# Patient Record
Sex: FEMALE | Race: WHITE
[De-identification: names, ages, dates, MRNs, and addresses within clinical notes are randomized per-mention and may not be internally consistent; named-entity substitution may affect disease eponyms.]

---
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
## 2025-03-17 ENCOUNTER — HOSPITAL ENCOUNTER (EMERGENCY)
Dept: HOSPITAL 15 - ER | Age: 20
Discharge: HOME | End: 2025-03-17
Payer: MEDICAID

## 2025-03-17 VITALS
SYSTOLIC BLOOD PRESSURE: 117 MMHG | DIASTOLIC BLOOD PRESSURE: 69 MMHG | RESPIRATION RATE: 16 BRPM | HEART RATE: 85 BPM | OXYGEN SATURATION: 98 % | TEMPERATURE: 98 F

## 2025-03-17 VITALS — WEIGHT: 102.29 LBS | BODY MASS INDEX: 18.12 KG/M2 | HEIGHT: 63 IN

## 2025-03-17 DIAGNOSIS — N39.0: Primary | ICD-10-CM

## 2025-03-17 DIAGNOSIS — R42: ICD-10-CM

## 2025-03-17 LAB
ANION GAP SERPL CALCULATED.3IONS-SCNC: 5 MMOL/L (ref 5–15)
BUN SERPL-MCNC: 8 MG/DL (ref 9–23)
BUN/CREAT SERPL: 11.4 (ref 10–20)
CALCIUM SERPL-MCNC: 9.9 MG/DL (ref 8.7–10.4)
CHLORIDE SERPL-SCNC: 107 MMOL/L (ref 98–107)
CO2 SERPL-SCNC: 28 MMOL/L (ref 20–31)
GLUCOSE SERPL-MCNC: 105 MG/DL (ref 74–106)
HCT VFR BLD AUTO: 42.4 % (ref 36–46)
HGB BLD-MCNC: 14.2 G/DL (ref 12.2–16.2)
MCH RBC QN AUTO: 30.5 PG (ref 28–32)
MCV RBC AUTO: 90.8 FL (ref 80–100)
NRBC BLD QL AUTO: 0 %
POTASSIUM SERPL-SCNC: 3.7 MMOL/L (ref 3.5–5.1)
SODIUM SERPL-SCNC: 140 MMOL/L (ref 136–145)

## 2025-03-17 PROCEDURE — 82962 GLUCOSE BLOOD TEST: CPT

## 2025-03-17 PROCEDURE — 82947 ASSAY GLUCOSE BLOOD QUANT: CPT

## 2025-03-17 PROCEDURE — 81025 URINE PREGNANCY TEST: CPT

## 2025-03-17 PROCEDURE — 81001 URINALYSIS AUTO W/SCOPE: CPT

## 2025-03-17 PROCEDURE — 36415 COLL VENOUS BLD VENIPUNCTURE: CPT

## 2025-03-17 PROCEDURE — 80048 BASIC METABOLIC PNL TOTAL CA: CPT

## 2025-03-17 PROCEDURE — 85025 COMPLETE CBC W/AUTO DIFF WBC: CPT

## 2025-03-17 PROCEDURE — 70450 CT HEAD/BRAIN W/O DYE: CPT

## 2025-03-17 NOTE — [facility]
EXAM: CT HEAD WITHOUT CONTRAST



HISTORY: unk



COMPARISON: None



TECHNIQUE: Axial images were obtained and reformatted in coronal and sagittal planes.



All CT scans at this medical facility are performed using dose modulation techniques as appropriate t
o a performed exam including the following: Automated exposure control was utilized; adjustment of th
e MA and/or KV according to patient size; and use of iterative reconstruction technique. 



CT Dose: CTDI volume is 51.72 mGy. Dose-length product is 812.45 mGy*cm



FINDINGS:



Supratentorial Region:  No evidence for large acute territorial ischemia.  No intracranial hemorrhage
 is noted. 



Posterior Fossa:  No acute abnormality.



Brainstem:  Unremarkable.



Sellar/Suprasellar Region:  Unremarkable.



Ventricles, Cisterns, Sulci:  Age-appropriate.



Orbits:  Unremarkable.



Paranasal Sinuses:  Unremarkable.



Mastoid Air Cells:  Unremarkable.



Vasculature:   Unremarkable. 



Bones/Soft Tissues:  No acute abnormality.



Other:  None.



IMPRESSION: 



1. No acute intracranial process.



Electronically Signed by: Suzanna Bauer at 03/17/2025 17:29:49 PM

## 2025-03-17 NOTE — ED.PDOC
HPI (NEURO)


HPI Comments


19 y.o female presents to the ED for a chief complaint of dizziness associated 

with nausea that started 4 days ago. Patient was seen at urgent care 2 days ago,

was given meclizine but reports no relief and now is experiencing generalized 

head pressure. Patient denies any syncopal episodes, vomiting, recent head 

trauma, fever, chills, or sick contact exposure. Patient reports history of 

sinusitis. No alcohol, substance or alcohol use.


Chief Complaint:  Dizziness


Time Seen by MD:  15:05


Primary Care Provider:  none


Reviewed Notes:  Nurses Notes, Medications, Allergies


Information Source:  Patient


Mode of Arrival:  Ambulatory


Severity:  Moderate


Headache Severity:  Moderate


Timing:  Days (4)


Duration:  Since onset


Headache Quality:  Other


Headache Location:  Generalized


Onset:  At rest


Circumstances:  Spontaneous


Symptoms:  None


History of:  None


Modifying factors:  Nothing


Associated Signs and Symptoms:  Headache, Nausea





Past Medical History


Past Medical History (Other):  


sinusitis


Surgical History:  Denies all surgeries


GYN History:  No Pertinent GYN History





Family History


Family History:  Reviewed,noncontributory to illness





Social History


Smoker:  Non-Smoker


Alcohol:  Denies ETOH Use


Drugs:  Denies Drug Use


Lives In:  Home





Constitutional:  denies: chills, diaphoresis, fatigue, fever, malaise, sweats, 

weakness, others


EENTM:  denies: blurred vision, double vision, ear bleeding, ear discharge, ear 

drainage, ear pain, ear ringing, eye pain, eye redness, hearing loss, mouth 

pain, mouth swelling, nasal discharge, nose bleeding, nose congestion, nose 

pain, photophobia, tearing, throat pain, throat swelling, voice changes, others


Respiratory:  denies: cough, hemoptysis, orthopnea, SOB at rest, shortness of 

breath, SOB with excertion, stridor, wheezing, others


Cardiovascular:  denies: chest pain, dizzy spells, diaphoresis, Dyspnea on 

exertion, edema, irregular heart beat, left arm pain, lightheadedness, 

palpitations, PND, syncope, others


Gastrointestinal:  reports: nausea; denies: abdomen distended, abdominal pain, 

blood streaked bowels, constipated, diarrhea, dysphagia, difficulty swallowing, 

hematemesis, melena, poor appetite, poor fluid intake, rectal bleeding, rectal 

pain, vomiting, others


Genitourinary:  denies: abnormal vagina bleeding, burning, dyspareunia, dysuria,

flank pain, frequency, hematuria, incontinence, pain, pregnant, vagina 

discharge, urgency, others


Neurological:  reports: dizziness, headache; denies: fainting, left sided 

numbness, left sided weakness, numbness, paresthesia, pre-existing deficit, 

right sided numbness, right sided weakness, seizure, speech problems, tingling, 

tremors, weakness, others


Musculoskeletal:  denies: back pain, gout, joint pain, joint swelling, muscle 

pain, muscle stiffness, neck pain, others


Integumetry:  denies: bruises, change in color, change in hair/nails, dryness, 

laceration, lesions, lumps, rash, wounds, others


Allergic/Immunocompromised:  denies: Difficulty Healing, Frequent Infections, 

Hives, Itching, others


Hematologic/Lymphatic:  denies: anemia, blood clots, easy bleeding, easy 

bruising, swollen glands, others


Endocrine:  denies: excessive hunger, excessive sweating, excessive thirst, 

excessive urination, flushing, intolerance to cold, intolerance to heat, 

unexplained weight gain, unexplained weight loss, others


Psychiatric:  denies: anxiety, bipolar disorder, depression, hopeless, panic 

disorder, schizophrenia, sleepless, suicidal, others


All Other Systems:  Reviewed and Negative





Physical Exam


General Appearance:  No Apparent Distress


HEENT:  Normal ENT Inspection, Pharynx Normal, TMs Normal


Neck:  Full Range of Motion, Non-Tender, Normal, Normal Inspection


Respiratory:  Chest Non-Tender, Lungs Clear, No Accessory Muscle Use, No 

Respiratory Distress, Normal Breath Sounds


Cardiovascular:  No Edema, No JVD, No Murmur, No Gallop, Normal Peripheral 

Pulses, Regular Rate/Rhythm


Breast Exam:  Deferred


Gastrointestinal:  No Organomegaly, Non Tender, No Pulsatile Mass, Normal Bowel 

Sounds, Soft


Genitalia:  Deferred


Pelvic:  Deferred


Rectal:  Deferred


Extremities:  No calf tenderness, Normal capillary refill, Normal inspection, 

Normal range of motion, Non-tender, No pedal edema


Musculoskeletal :  


   Apperance:  Normal


Neurologic:  Alert, CNs II-XII nml as Tested, No Motor Deficits, Normal Affect, 

Normal Mood, No Sensory Deficits


Cerebellar Function:  Normal


Reflexes:  Normal


Skin:  Dry, Normal Color, Warm


Lymphatic:  No Adenopathy





Was a procedure done?


Was a procedure done?:  No





Differential Diagnosis (SZ)


Seizure:  N/A


General Weakness:  Dehydration, Vertigo: central, Vertigo: peripheral, 

Vestibular neuronitis


Headache:  Cluster, Migraine, Sinusitis





X-Ray, Labs, Meds, VS





                                   Vital Signs








  Date Time  Temp Pulse Resp B/P (MAP) Pulse Ox O2 Delivery O2 Flow Rate FiO2


 


3/17/25 15:10 98.0 76 17 96/65 (75) 100   





 98.0       








                                       Lab








Test


 3/17/25


15:23 3/17/25


15:07 3/17/25


14:30 Range/Units


 


 


White Blood Count


 5.7 


 


 


 4.4-10.8


10^3/uL


 


Red Blood Count


 4.66 


 


 


 4.0-5.20


10^6/uL


 


Hemoglobin 14.2    12.2-16.2  g/dL


 


Hematocrit 42.4    36.0-46.0  %


 


Mean Corpuscular Volume 90.8    80.0-100.0  fL


 


Mean Corpuscular Hemoglobin 30.5    28.0-32.0  pg


 


Mean Corpuscular Hemoglobin


Concent 33.6 


 


 


 32.0-36.0  g/dL





 


Red Cell Distribution Width 13.4    11.8-14.3  %


 


Platelet Count


 229 


 


 


 140-450


10^3/uL


 


Mean Platelet Volume 8.4    6.9-10.8  fL


 


Neutrophils (%) (Auto) 61.0    37.0-80.0  %


 


Lymphocytes (%) (Auto) 27.4    10.0-50.0  %


 


Monocytes (%) (Auto) 10.2    0.0-12.0  %


 


Eosinophils (%) (Auto) 0.7    0.0-7.0  %


 


Basophils (%) (Auto) 0.7    0.0-2.0  %


 


Neutrophils # (Auto)


 3.5 


 


 


 1.6-8.6  10


^3/uL


 


Lymphocytes # (Auto)


 1.6 


 


 


 0.4-5.4  10


^3/uL


 


Monocytes # (Auto) 0.6    0-1.3  10 ^3/uL


 


Eosinophils # (Auto) 0    0-0.8  10 ^3/uL


 


Basophils # (Auto) 0    0-0.2  10 ^3/uL


 


Nucleated Red Blood Cells 0.0     %


 


Sodium Level 140    136-145  mmol/L


 


Potassium Level 3.7    3.5-5.1  mmol/L


 


Chloride Level 107      mmol/L


 


Carbon Dioxide Level 28    20-31  mmol/L


 


Anion Gap 5    5-15  


 


Blood Urea Nitrogen 8 L   9-23  mg/dL


 


Creatinine


 0.70 


 


 


 0.550-1.02


mg/dL


 


Glomerular Filtration Rate


Calc 127 


 


 


 >90  mL/min





 


BUN/Creatinine Ratio 11.4    10.0-20.0  


 


Serum Glucose 105      mg/dL


 


Calcium Level 9.9    8.7-10.4  mg/dL


 


POC Glucose  99     mg/dl


 


Urine Color   Light-yellow  Yellow  


 


Urine Clarity   Clear  Clear  


 


Urine pH   5.5  5.0-9.0  


 


Urine Specific Gravity   1.010  1.001-1.035  


 


Urine Protein   Negative  Negative  


 


Urine Ketones   Negative  Negative  


 


Urine Blood   Negative  Negative  /uL


 


Urine Nitrite   Negative  Negative  


 


Urine Bilirubin   Negative  Negative  


 


Urine Urobilinogen   Normal  Negative  mg/dL


 


Urine Leukocyte Esterase   3+  Negative  /uL


 


Urine RBC   4  0 - 4  /hpf


 


Urine Microscopic WBC   9 H 0-5  /HPF


 


Urine Squamous Epithelial


Cells 


 


 Few 


 <5  /hpf





 


Urine Bacteria   None seen  None Seen  /hpf


 


Urine Glucose   Normal  Normal  mg/dL


 


Urine Pregnancy Test   Negative  Negative  





The patient's CBC and chemistry panel are within normal limits 


The urine test is positive for a UTI


The pregnancy test is negative 


The patient's CT scan of the head shows no sign of any abnormalities


The patient was being discharged and will follow up with the primary care doctor


The patient will return to the emergency department's condition worsens


The patient understands and agrees with the management.


Images Reviewed?:  Images reviewed and evaluated by me


Time of 1ST Reevaluation:  15:21


Reevaluation 1ST:  Unchanged


Patient Education/Counseling:  Diagnosis, Treatment, Prognosis, Need For Follow 

Up


Family Education/Counseling:  No Family Present





Departure 1


Departure


Time of Disposition:  18:14


Impression:  


   Primary Impression:  


   UTI (urinary tract infection)


   Qualified Codes:  N30.00 - Acute cystitis without hematuria


   Additional Impression:  


   Dizziness


Disposition:  01 HOME / SELF CARE / HOMELESS


Condition:  Fair


e-Prescriptions


Ciprofloxacin Hcl (Cipro) 500 Mg Tab


500 MG PO BID for 7 Days, #14 TAB


   Prov: LUCIE MEADOWS MD         3/17/25


Discharged With:  Self





Critical Care Note


Critical Care Time?:  No





Stability


Stability form required:  No








I personally scribed for LUCIE MEADOWS MD ([email or web address]) on 3/17/25 at 15:24.  

Electronically submitted by Joi Cesar ([facility]).





LUCIE MEADOWS MD              Mar 17, 2025 15:24

## 2025-05-01 ENCOUNTER — HOSPITAL ENCOUNTER (EMERGENCY)
Dept: HOSPITAL 15 - ER | Age: 20
LOS: 1 days | Discharge: HOME | End: 2025-05-02
Payer: MEDICAID

## 2025-05-01 VITALS
TEMPERATURE: 97.5 F | DIASTOLIC BLOOD PRESSURE: 62 MMHG | OXYGEN SATURATION: 96 % | SYSTOLIC BLOOD PRESSURE: 100 MMHG | RESPIRATION RATE: 18 BRPM | HEART RATE: 87 BPM

## 2025-05-01 DIAGNOSIS — K52.9: Primary | ICD-10-CM

## 2025-05-01 DIAGNOSIS — Z20.822: ICD-10-CM

## 2025-05-01 DIAGNOSIS — N39.0: ICD-10-CM

## 2025-05-01 DIAGNOSIS — Z32.02: ICD-10-CM

## 2025-05-01 LAB
PROT UR STRIP.AUTO-MCNC: (no result) MG/DL
SARS-COV+SARS-COV-2 AG RESP QL IA.RAPID: NEGATIVE

## 2025-05-01 PROCEDURE — 96372 THER/PROPH/DIAG INJ SC/IM: CPT

## 2025-05-01 PROCEDURE — 81001 URINALYSIS AUTO W/SCOPE: CPT

## 2025-05-01 PROCEDURE — 87804 INFLUENZA ASSAY W/OPTIC: CPT

## 2025-05-01 PROCEDURE — 99283 EMERGENCY DEPT VISIT LOW MDM: CPT

## 2025-05-01 PROCEDURE — 36415 COLL VENOUS BLD VENIPUNCTURE: CPT

## 2025-05-01 PROCEDURE — 81025 URINE PREGNANCY TEST: CPT

## 2025-05-01 PROCEDURE — 87426 SARSCOV CORONAVIRUS AG IA: CPT

## 2025-05-01 RX ADMIN — PANTOPRAZOLE SODIUM ONE MG: 40 TABLET, DELAYED RELEASE ORAL at 23:48

## 2025-05-02 RX ADMIN — DEXTROSE ONE MG: 50 INJECTION, SOLUTION INTRAVENOUS at 00:02
